# Patient Record
Sex: FEMALE | Race: WHITE | NOT HISPANIC OR LATINO | Employment: FULL TIME | URBAN - METROPOLITAN AREA
[De-identification: names, ages, dates, MRNs, and addresses within clinical notes are randomized per-mention and may not be internally consistent; named-entity substitution may affect disease eponyms.]

---

## 2023-11-02 PROBLEM — F60.3 BORDERLINE PERSONALITY DISORDER IN ADULT (MULTI): Status: ACTIVE | Noted: 2023-11-02

## 2023-11-02 PROBLEM — F32.9 DEPRESSION, MAJOR: Status: ACTIVE | Noted: 2023-11-02

## 2023-11-02 PROBLEM — N60.02 BREAST CYST, LEFT: Status: ACTIVE | Noted: 2023-11-02

## 2023-11-02 PROBLEM — N95.1 PERIMENOPAUSAL SYMPTOMS: Status: ACTIVE | Noted: 2023-11-02

## 2023-11-02 PROBLEM — M50.30 DEGENERATION OF INTERVERTEBRAL DISC OF CERVICAL REGION: Status: ACTIVE | Noted: 2023-11-02

## 2023-11-02 PROBLEM — I67.89 CEREBRAL MICROVASCULAR DISEASE: Status: ACTIVE | Noted: 2023-11-02

## 2023-11-02 PROBLEM — F33.40 RECURRENT MAJOR DEPRESSIVE DISORDER, IN REMISSION (CMS-HCC): Status: ACTIVE | Noted: 2023-11-02

## 2023-11-02 PROBLEM — R94.4 DECREASED CALCULATED GFR: Status: ACTIVE | Noted: 2023-11-02

## 2023-11-02 PROBLEM — E66.9 OBESITY: Status: ACTIVE | Noted: 2023-11-02

## 2023-11-02 PROBLEM — M54.12 CERVICAL RADICULAR PAIN: Status: ACTIVE | Noted: 2023-11-02

## 2023-11-02 PROBLEM — G43.909 MIGRAINE HEADACHE: Status: ACTIVE | Noted: 2023-11-02

## 2023-11-02 RX ORDER — IBUPROFEN 600 MG/1
600 TABLET ORAL 4 TIMES DAILY PRN
COMMUNITY

## 2023-11-02 RX ORDER — RIZATRIPTAN BENZOATE 5 MG/1
5 TABLET, ORALLY DISINTEGRATING ORAL
COMMUNITY
Start: 2022-12-16 | End: 2023-11-03 | Stop reason: SDUPTHER

## 2023-11-02 RX ORDER — LAMOTRIGINE 100 MG/1
1 TABLET ORAL DAILY
COMMUNITY
End: 2023-11-03 | Stop reason: SDUPTHER

## 2023-11-02 RX ORDER — MIRTAZAPINE 30 MG/1
1 TABLET, FILM COATED ORAL NIGHTLY
COMMUNITY
End: 2023-11-03 | Stop reason: SDUPTHER

## 2023-11-03 ENCOUNTER — OFFICE VISIT (OUTPATIENT)
Dept: PRIMARY CARE | Facility: CLINIC | Age: 48
End: 2023-11-03
Payer: COMMERCIAL

## 2023-11-03 DIAGNOSIS — F60.3 BORDERLINE PERSONALITY DISORDER IN ADULT (MULTI): ICD-10-CM

## 2023-11-03 DIAGNOSIS — Z00.00 WELL ADULT HEALTH CHECK: ICD-10-CM

## 2023-11-03 DIAGNOSIS — E66.9 CLASS 2 OBESITY WITHOUT SERIOUS COMORBIDITY WITH BODY MASS INDEX (BMI) OF 37.0 TO 37.9 IN ADULT, UNSPECIFIED OBESITY TYPE: ICD-10-CM

## 2023-11-03 DIAGNOSIS — F41.9 ANXIETY: ICD-10-CM

## 2023-11-03 DIAGNOSIS — F33.1 MODERATE EPISODE OF RECURRENT MAJOR DEPRESSIVE DISORDER (MULTI): Primary | ICD-10-CM

## 2023-11-03 DIAGNOSIS — G43.009 MIGRAINE WITHOUT AURA AND WITHOUT STATUS MIGRAINOSUS, NOT INTRACTABLE: ICD-10-CM

## 2023-11-03 PROBLEM — F33.42 RECURRENT MAJOR DEPRESSION IN FULL REMISSION (CMS-HCC): Status: ACTIVE | Noted: 2022-05-18

## 2023-11-03 PROBLEM — I83.813 VARICOSE VEINS OF BOTH LOWER EXTREMITIES WITH PAIN: Chronic | Status: ACTIVE | Noted: 2018-06-06

## 2023-11-03 PROBLEM — F32.9 DEPRESSION, MAJOR: Status: ACTIVE | Noted: 2022-05-18

## 2023-11-03 PROCEDURE — 3008F BODY MASS INDEX DOCD: CPT | Performed by: FAMILY MEDICINE

## 2023-11-03 PROCEDURE — 99214 OFFICE O/P EST MOD 30 MIN: CPT | Performed by: FAMILY MEDICINE

## 2023-11-03 PROCEDURE — 1036F TOBACCO NON-USER: CPT | Performed by: FAMILY MEDICINE

## 2023-11-03 RX ORDER — LAMOTRIGINE 100 MG/1
100 TABLET ORAL DAILY
Qty: 90 TABLET | Refills: 1 | Status: SHIPPED | OUTPATIENT
Start: 2023-11-03 | End: 2024-05-14

## 2023-11-03 RX ORDER — RIZATRIPTAN BENZOATE 5 MG/1
5 TABLET, ORALLY DISINTEGRATING ORAL ONCE AS NEEDED
Qty: 18 TABLET | Refills: 1 | Status: SHIPPED | OUTPATIENT
Start: 2023-11-03 | End: 2024-02-01

## 2023-11-03 RX ORDER — MIRTAZAPINE 30 MG/1
30 TABLET, FILM COATED ORAL NIGHTLY
Qty: 90 TABLET | Refills: 1 | Status: SHIPPED | OUTPATIENT
Start: 2023-11-03 | End: 2024-05-20 | Stop reason: SDUPTHER

## 2023-11-04 VITALS
SYSTOLIC BLOOD PRESSURE: 138 MMHG | WEIGHT: 206 LBS | TEMPERATURE: 97.2 F | DIASTOLIC BLOOD PRESSURE: 86 MMHG | HEART RATE: 73 BPM | BODY MASS INDEX: 37.91 KG/M2 | HEIGHT: 62 IN

## 2023-11-04 ASSESSMENT — ENCOUNTER SYMPTOMS
FATIGUE: 0
ARTHRALGIAS: 0
SHORTNESS OF BREATH: 0
DIARRHEA: 0
FEVER: 0
DIZZINESS: 0
NERVOUS/ANXIOUS: 0
HEADACHES: 0
ACTIVITY CHANGE: 0
CHEST TIGHTNESS: 0
PALPITATIONS: 0
APPETITE CHANGE: 0
CONSTIPATION: 0
COUGH: 0
ABDOMINAL PAIN: 0

## 2023-11-04 NOTE — PROGRESS NOTES
"Subjective   Patient ID: Bekah Robles is a 48 y.o. female who presents for Follow-up (6 month follow up. ).    HPI   1.  Migraine headaches-typically 2-3 headaches per month, usually isolated around the time of menses.  Often wakes from sleep with headache.  Denies disrupted sleep or snoring.  No daytime fatigue or drowsiness.  Less complete relief with current dose of Maxalt.  Often also takes ibuprofen.  2.  Depression/anxiety/history of borderline personality disorder-doing well on current regimen of lamotrigine and mirtazapine.  No breakthrough depression.  No SI or HI.  Feeling well.  Recently got .  Exercising consistently.  Review of Systems   Constitutional:  Negative for activity change, appetite change, fatigue and fever.   Eyes:  Negative for visual disturbance.   Respiratory:  Negative for cough, chest tightness and shortness of breath.    Cardiovascular:  Negative for chest pain, palpitations and leg swelling.   Gastrointestinal:  Negative for abdominal pain, constipation and diarrhea.   Musculoskeletal:  Negative for arthralgias.   Neurological:  Negative for dizziness and headaches.   Psychiatric/Behavioral:  Negative for suicidal ideas. The patient is not nervous/anxious.        Objective   /86 (BP Location: Left arm)   Pulse 73   Temp 36.2 °C (97.2 °F)   Ht 1.575 m (5' 2\")   Wt 93.4 kg (206 lb)   BMI 37.68 kg/m²     Physical Exam    Assessment/Plan   Diagnoses and all orders for this visit:  Moderate episode of recurrent major depressive disorder (CMS/HCC)  -     lamoTRIgine (LaMICtal) 100 mg tablet; Take 1 tablet (100 mg) by mouth once daily.  -     mirtazapine (Remeron) 30 mg tablet; Take 1 tablet (30 mg) by mouth once daily at bedtime.  Borderline personality disorder in adult (CMS/HCC)  Anxiety  -     mirtazapine (Remeron) 30 mg tablet; Take 1 tablet (30 mg) by mouth once daily at bedtime.  Class 2 obesity without serious comorbidity with body mass index (BMI) of 37.0 to " 37.9 in adult, unspecified obesity type  -     TSH with reflex to Free T4 if abnormal; Future  Well adult health check  -     CBC and Auto Differential; Future  -     Comprehensive Metabolic Panel; Future  -     Lipid Panel; Future  -     TSH with reflex to Free T4 if abnormal; Future  Migraine without aura and without status migrainosus, not intractable  -     rizatriptan MLT (Maxalt-MLT) 5 mg disintegrating tablet; Take 1 tablet (5 mg) by mouth 1 time if needed for migraine. AT ONSET OF HEADACHE. MAY REPEAT EVERY 2 HOURS AS NEEDED. MAXIMUM OF 2 TABLETS IN 24 HOURS  Other orders  -     Follow Up In Primary Care - Established; Future

## 2024-04-23 ENCOUNTER — TELEPHONE (OUTPATIENT)
Dept: PRIMARY CARE | Facility: CLINIC | Age: 49
End: 2024-04-23
Payer: COMMERCIAL

## 2024-04-24 NOTE — TELEPHONE ENCOUNTER
In a few weeks the meds she will need are mirtazapine and lamotrigine. But she will not need these refilled for a few weeks yet.

## 2024-04-24 NOTE — TELEPHONE ENCOUNTER
Patient no longer has insurance so she is very reluctant to schedule an appointment . She will be leaving the state in a few months and just wants to be sure she can get two more med refills. After that she will see a Doctor at her new location for further refills.

## 2024-05-12 DIAGNOSIS — F33.1 MODERATE EPISODE OF RECURRENT MAJOR DEPRESSIVE DISORDER (MULTI): ICD-10-CM

## 2024-05-12 DIAGNOSIS — F41.9 ANXIETY: ICD-10-CM

## 2024-05-14 RX ORDER — LAMOTRIGINE 100 MG/1
TABLET ORAL DAILY
Qty: 90 TABLET | Refills: 0 | Status: SHIPPED | OUTPATIENT
Start: 2024-05-14

## 2024-05-14 NOTE — TELEPHONE ENCOUNTER
Please see encounter from 4/25 regarding follow up - patient no longer has insurance and is moving out of state

## 2024-05-20 RX ORDER — MIRTAZAPINE 30 MG/1
30 TABLET, FILM COATED ORAL NIGHTLY
Qty: 90 TABLET | Refills: 0 | Status: SHIPPED | OUTPATIENT
Start: 2024-05-20

## 2024-05-20 NOTE — TELEPHONE ENCOUNTER
Patient asked that the mirtazapine  be refilled with the lamotrigine, I read her the message re needing an apt but she was told these both would be filled 1 last time.        mirtazapine (Remeron) 30 mg tablet       [id number] - [place] - [address]  [address], [facility] [address]  Phone: [phone]  Fax: [phone]
